# Patient Record
Sex: FEMALE | Employment: FULL TIME | URBAN - METROPOLITAN AREA
[De-identification: names, ages, dates, MRNs, and addresses within clinical notes are randomized per-mention and may not be internally consistent; named-entity substitution may affect disease eponyms.]

---

## 2023-06-26 ENCOUNTER — HOSPITAL ENCOUNTER (OUTPATIENT)
Dept: ULTRASOUND IMAGING | Age: 28
Discharge: HOME | End: 2023-06-26
Attending: OBSTETRICS & GYNECOLOGY
Payer: COMMERCIAL

## 2023-06-26 DIAGNOSIS — O36.80X0 PREGNANCY WITH INCONCLUSIVE FETAL VIABILITY, SINGLE OR UNSPECIFIED FETUS: ICD-10-CM

## 2023-06-26 PROCEDURE — 76801 OB US < 14 WKS SINGLE FETUS: CPT

## 2023-06-26 PROCEDURE — 76817 TRANSVAGINAL US OBSTETRIC: CPT

## 2024-02-03 ENCOUNTER — ANESTHESIA EVENT (OUTPATIENT)
Dept: LABOR AND DELIVERY | Age: 29
End: 2024-02-03
Payer: COMMERCIAL

## 2024-02-03 ENCOUNTER — ANESTHESIA (OUTPATIENT)
Dept: LABOR AND DELIVERY | Age: 29
End: 2024-02-03
Payer: COMMERCIAL

## 2024-02-03 ENCOUNTER — HOSPITAL ENCOUNTER (INPATIENT)
Age: 29
LOS: 2 days | Discharge: HOME | End: 2024-02-05
Attending: OBSTETRICS & GYNECOLOGY | Admitting: OBSTETRICS & GYNECOLOGY
Payer: COMMERCIAL

## 2024-02-03 PROBLEM — O42.92 FULL-TERM PREMATURE RUPTURE OF MEMBRANES: Status: ACTIVE | Noted: 2024-02-03

## 2024-02-03 LAB
ABO + RH BLD: NORMAL
AMPHETAMINES UR QL SCN>1000 NG/ML: NORMAL
BARBITURATES UR QL SCN>200 NG/ML: NORMAL
BASOPHILS # BLD: 0 K/UL (ref 0–0.2)
BASOPHILS NFR BLD: 0.2 %
BENZODIAZ UR QL SCN>200 NG/ML: NORMAL
BLD GP AB SCN SERPL QL: NORMAL
BLD GP AB SCN SERPL QL: NORMAL
BUPRENORPHINE+NOR UR QL SCN: NORMAL
CANNABINOIDS UR QL SCN>50 NG/ML: NORMAL
COCAINE UR QL SCN: NORMAL
DEPRECATED RDW RBC AUTO: 13.7 % (ref 12.4–15.4)
DRUG SCREEN COMMENT UR-IMP: NORMAL
EOSINOPHIL # BLD: 0 K/UL (ref 0–0.6)
EOSINOPHIL NFR BLD: 0.2 %
FENTANYL SCREEN, URINE: NORMAL
HCT VFR BLD AUTO: 36.4 % (ref 36–48)
HGB BLD-MCNC: 12.2 G/DL (ref 12–16)
LYMPHOCYTES # BLD: 1.6 K/UL (ref 1–5.1)
LYMPHOCYTES NFR BLD: 11.8 %
MCH RBC QN AUTO: 30.2 PG (ref 26–34)
MCHC RBC AUTO-ENTMCNC: 33.6 G/DL (ref 31–36)
MCV RBC AUTO: 89.9 FL (ref 80–100)
METHADONE UR QL SCN>300 NG/ML: NORMAL
MONOCYTES # BLD: 0.8 K/UL (ref 0–1.3)
MONOCYTES NFR BLD: 5.9 %
NEUTROPHILS # BLD: 11.4 K/UL (ref 1.7–7.7)
NEUTROPHILS NFR BLD: 81.9 %
OPIATES UR QL SCN>300 NG/ML: NORMAL
OXYCODONE UR QL SCN: NORMAL
PCP UR QL SCN>25 NG/ML: NORMAL
PH UR STRIP: 6 [PH]
PLATELET # BLD AUTO: 200 K/UL (ref 135–450)
PMV BLD AUTO: 9.3 FL (ref 5–10.5)
RBC # BLD AUTO: 4.05 M/UL (ref 4–5.2)
WBC # BLD AUTO: 14 K/UL (ref 4–11)

## 2024-02-03 PROCEDURE — 86850 RBC ANTIBODY SCREEN: CPT

## 2024-02-03 PROCEDURE — 6360000002 HC RX W HCPCS: Performed by: NURSE ANESTHETIST, CERTIFIED REGISTERED

## 2024-02-03 PROCEDURE — 6360000002 HC RX W HCPCS: Performed by: OBSTETRICS & GYNECOLOGY

## 2024-02-03 PROCEDURE — 2500000003 HC RX 250 WO HCPCS: Performed by: NURSE ANESTHETIST, CERTIFIED REGISTERED

## 2024-02-03 PROCEDURE — 2580000003 HC RX 258: Performed by: OBSTETRICS & GYNECOLOGY

## 2024-02-03 PROCEDURE — 1220000000 HC SEMI PRIVATE OB R&B

## 2024-02-03 PROCEDURE — 86900 BLOOD TYPING SEROLOGIC ABO: CPT

## 2024-02-03 PROCEDURE — 80307 DRUG TEST PRSMV CHEM ANLYZR: CPT

## 2024-02-03 PROCEDURE — 86901 BLOOD TYPING SEROLOGIC RH(D): CPT

## 2024-02-03 PROCEDURE — 85025 COMPLETE CBC W/AUTO DIFF WBC: CPT

## 2024-02-03 PROCEDURE — 86780 TREPONEMA PALLIDUM: CPT

## 2024-02-03 RX ORDER — ONDANSETRON 2 MG/ML
4 INJECTION INTRAMUSCULAR; INTRAVENOUS EVERY 6 HOURS PRN
Status: DISCONTINUED | OUTPATIENT
Start: 2024-02-03 | End: 2024-02-04

## 2024-02-03 RX ORDER — MISOPROSTOL 100 UG/1
800 TABLET ORAL PRN
Status: DISCONTINUED | OUTPATIENT
Start: 2024-02-03 | End: 2024-02-04

## 2024-02-03 RX ORDER — SODIUM CHLORIDE, SODIUM LACTATE, POTASSIUM CHLORIDE, CALCIUM CHLORIDE 600; 310; 30; 20 MG/100ML; MG/100ML; MG/100ML; MG/100ML
INJECTION, SOLUTION INTRAVENOUS CONTINUOUS
Status: DISCONTINUED | OUTPATIENT
Start: 2024-02-03 | End: 2024-02-04

## 2024-02-03 RX ORDER — FENTANYL CITRATE 50 UG/ML
INJECTION, SOLUTION INTRAMUSCULAR; INTRAVENOUS
Status: COMPLETED
Start: 2024-02-03 | End: 2024-02-03

## 2024-02-03 RX ORDER — CARBOPROST TROMETHAMINE 250 UG/ML
250 INJECTION, SOLUTION INTRAMUSCULAR PRN
Status: DISCONTINUED | OUTPATIENT
Start: 2024-02-03 | End: 2024-02-04

## 2024-02-03 RX ORDER — FERROUS SULFATE 325(65) MG
325 TABLET ORAL
Status: ON HOLD | COMMUNITY
End: 2024-02-05 | Stop reason: HOSPADM

## 2024-02-03 RX ORDER — BUPIVACAINE HYDROCHLORIDE 2.5 MG/ML
INJECTION, SOLUTION EPIDURAL; INFILTRATION; INTRACAUDAL PRN
Status: DISCONTINUED | OUTPATIENT
Start: 2024-02-03 | End: 2024-02-15 | Stop reason: SDUPTHER

## 2024-02-03 RX ORDER — FAMOTIDINE 10 MG/ML
20 INJECTION, SOLUTION INTRAVENOUS 2 TIMES DAILY
Status: DISCONTINUED | OUTPATIENT
Start: 2024-02-03 | End: 2024-02-04

## 2024-02-03 RX ORDER — LIDOCAINE HYDROCHLORIDE AND EPINEPHRINE BITARTRATE 20; .01 MG/ML; MG/ML
INJECTION, SOLUTION SUBCUTANEOUS PRN
Status: DISCONTINUED | OUTPATIENT
Start: 2024-02-03 | End: 2024-02-15 | Stop reason: SDUPTHER

## 2024-02-03 RX ORDER — METHYLERGONOVINE MALEATE 0.2 MG/ML
200 INJECTION INTRAVENOUS PRN
Status: DISCONTINUED | OUTPATIENT
Start: 2024-02-03 | End: 2024-02-04

## 2024-02-03 RX ORDER — ASPIRIN 81 MG/1
81 TABLET ORAL DAILY
Status: ON HOLD | COMMUNITY
End: 2024-02-04

## 2024-02-03 RX ORDER — DOCUSATE SODIUM 100 MG/1
100 CAPSULE, LIQUID FILLED ORAL 2 TIMES DAILY
Status: DISCONTINUED | OUTPATIENT
Start: 2024-02-03 | End: 2024-02-04

## 2024-02-03 RX ORDER — FENTANYL CITRATE 50 UG/ML
INJECTION, SOLUTION INTRAMUSCULAR; INTRAVENOUS PRN
Status: DISCONTINUED | OUTPATIENT
Start: 2024-02-03 | End: 2024-02-15 | Stop reason: SDUPTHER

## 2024-02-03 RX ORDER — SODIUM CHLORIDE, SODIUM LACTATE, POTASSIUM CHLORIDE, AND CALCIUM CHLORIDE .6; .31; .03; .02 G/100ML; G/100ML; G/100ML; G/100ML
1000 INJECTION, SOLUTION INTRAVENOUS PRN
Status: DISCONTINUED | OUTPATIENT
Start: 2024-02-03 | End: 2024-02-04

## 2024-02-03 RX ORDER — SODIUM CHLORIDE, SODIUM LACTATE, POTASSIUM CHLORIDE, AND CALCIUM CHLORIDE .6; .31; .03; .02 G/100ML; G/100ML; G/100ML; G/100ML
500 INJECTION, SOLUTION INTRAVENOUS PRN
Status: DISCONTINUED | OUTPATIENT
Start: 2024-02-03 | End: 2024-02-04

## 2024-02-03 RX ADMIN — FENTANYL CITRATE 50 MCG: 50 INJECTION, SOLUTION INTRAMUSCULAR; INTRAVENOUS at 16:24

## 2024-02-03 RX ADMIN — LIDOCAINE HYDROCHLORIDE AND EPINEPHRINE 3 ML: 20; 10 INJECTION, SOLUTION INFILTRATION; PERINEURAL at 16:15

## 2024-02-03 RX ADMIN — SODIUM CHLORIDE, POTASSIUM CHLORIDE, SODIUM LACTATE AND CALCIUM CHLORIDE: 600; 310; 30; 20 INJECTION, SOLUTION INTRAVENOUS at 16:30

## 2024-02-03 RX ADMIN — ONDANSETRON 4 MG: 2 INJECTION INTRAMUSCULAR; INTRAVENOUS at 23:44

## 2024-02-03 RX ADMIN — Medication 1 MILLI-UNITS/MIN: at 17:01

## 2024-02-03 RX ADMIN — FENTANYL CITRATE 50 MCG: 50 INJECTION, SOLUTION INTRAMUSCULAR; INTRAVENOUS at 16:15

## 2024-02-03 RX ADMIN — SODIUM CHLORIDE, POTASSIUM CHLORIDE, SODIUM LACTATE AND CALCIUM CHLORIDE: 600; 310; 30; 20 INJECTION, SOLUTION INTRAVENOUS at 11:00

## 2024-02-03 RX ADMIN — BUPIVACAINE HYDROCHLORIDE 5 MG: 2.5 INJECTION, SOLUTION EPIDURAL; INFILTRATION; INTRACAUDAL; PERINEURAL at 16:20

## 2024-02-03 RX ADMIN — Medication 15 ML/HR: at 16:26

## 2024-02-03 NOTE — ANESTHESIA PROCEDURE NOTES
CSE Block    Patient location during procedure: OB  Start time: 2/3/2024 4:10 PM  End time: 2/3/2024 4:15 PM  Reason for block: primary anesthetic  Staffing  Performed: resident/CRNA   Resident/CRNA: Dejon Perez APRN - CRNA  Performed by: Dejon Perez APRN - CRNA  Authorized by: James Ramirez MD    CSE  Patient position: sitting  Prep: ChloraPrep  Patient monitoring: cardiac monitor, continuous pulse ox and frequent blood pressure checks  Approach: midline  Provider prep: mask and sterile gloves  Spinal Needle  Needle type: pencil-tip   Needle gauge: 25 G  Needle length: 3.5 in  Epidural Needle  Injection technique: OTIS saline  Needle type: Tuohy   Needle gauge: 18 G  Needle length: 2.5 in  Needle insertion depth: 6 cm  Location: lumbar (1-5)  Catheter  Catheter type: end hole  Catheter size: 18 G  Catheter at skin depth: 6 cm  Test dose: negative  [id number]  Hemodynamics: stable  Preanesthetic Checklist  Completed: patient identified, IV checked, site marked, risks and benefits discussed, surgical/procedural consents, equipment checked, pre-op evaluation, timeout performed, anesthesia consent given, oxygen available, monitors applied/VS acknowledged, fire risk safety assessment completed and verbalized and blood product R/B/A discussed and consented

## 2024-02-03 NOTE — ANESTHESIA PRE PROCEDURE
Department of Anesthesiology  Preprocedure Note       Name:  Naheed FERREIRA   Age:  28 y.o.  :  1995                                          MRN:  [id number]         Date:  2/3/2024      Surgeon: * No surgeons listed *    Procedure: * No procedures listed *    Medications prior to admission:   Prior to Admission medications    Medication Sig Start Date End Date Taking? Authorizing Provider   Prenatal MV-Min-Fe Fum-FA-DHA (PRENATAL 1 PO) Take 1 tablet by mouth daily   Yes ProviderManuel MD   ferrous sulfate (IRON 325) 325 (65 Fe) MG tablet Take 1 tablet by mouth daily (with breakfast)   Yes ProviderManuel MD   aspirin 81 MG EC tablet Take 1 tablet by mouth daily   Yes Provider, MD Manuel       Current medications:    Current Facility-Administered Medications   Medication Dose Route Frequency Provider Last Rate Last Admin    lactated ringers IV soln infusion   IntraVENous Continuous Yesenia Sotomayor  mL/hr at 24 1100 New Bag at 24 1100    lactated ringers bolus bolus 500 mL  500 mL IntraVENous PRN Yesenia Sotomayor MD        Or    lactated ringers bolus bolus 1,000 mL  1,000 mL IntraVENous PRN Yesenia Sotomayor MD        methylergonovine (METHERGINE) injection 200 mcg  200 mcg IntraMUSCular PRN Yesenia Sotomayor MD        carboprost (HEMABATE) injection 250 mcg  250 mcg IntraMUSCular PRN Yesenia Sotomayor MD        miSOPROStol (CYTOTEC) tablet 800 mcg  800 mcg Rectal PRN Yesenia Sotomayor MD        tranexamic acid (CYKLOKAPRON) 1,000 mg in sodium chloride 0.9 % 100 mL IVPB  1,000 mg IntraVENous Once PRN Yesenia Sotomayor MD        oxytocin (PITOCIN) 30 units in 500 mL infusion  87.3 eric-units/min IntraVENous Continuous PRN Yesenia Sotomayor MD        And    oxytocin (PITOCIN) 10 unit bolus from the bag  10 Units IntraVENous PRN Yesenia Sotomayor MD        famotidine (PEPCID) injection 20 mg  20 mg IntraVENous BID Yesenia Sotomayor MD        ondansetron (ZOFRAN) injection 4 mg  4 mg IntraVENous Q6H

## 2024-02-03 NOTE — H&P
[facility]  Department of Obstetrics and Gynecology  History and Physical      CHIEF COMPLAINT:  ruptured membranes and contractions    HISTORY OF PRESENT ILLNESS:      The patient is a 28 y.o. y.o.  at 39w6d by LMP confirmed by 8w4d ultrasound, EDC 2024 is admitted for delivery.  Patient reports leakage of fluid starting yesterday 23, small spurts at first, becoming larger amounts and more continuous as time went by.  Clear fluid.  Contractions started during the night and became more painful over time.  + fetal movement, vaginal bleeding.    Prenatal Problem List includes: BMI >30, RH negative    [facility]:    [facility]    Blood Type/Rh:  A NEG    Antibody Screen:  Neg  Rubella:  Immune  RPR:  NR  Hepatitis B Surface Antigen: Neg  HIV:  Neg  Gonorrhea:  Neg  Chlamydia:  Neg  1 hour Glucose Tolerance Test:  101 early, then 92  Group B Strep:  negative      REVIEW OF SYSTEMS:    CONSTITUTIONAL:  negative for  fevers and sweats  NEURO: no headache, no vision changes  RESPIRATORY:  negative for dyspnea  CARDIOVASCULAR:  negative for  chest pain  GASTROINTESTINAL:  negative for nausea, vomiting and change in bowel habits, no RUQ pain    PHYSICAL EXAM:  Vitals:    24 1504   BP: 115/70   Pulse: (!) 105   Resp: 16   Temp: 98 °F (36.7 °C)   SpO2:      CONSTITUTIONAL:  awake, alert, cooperative, no apparent distress, and appears stated age  ABDOMEN:  Gravid, non tender  MUSCULOSKELETAL:  Full range of motion  Fetal heart rate: CAT 1, moderate variability, + accels.  REACTIVE NST  Cervix: 1/50 on admission  Contraction frequency:  q 5 minutes  Membranes:  ruptured    General Labs:      Recent Labs     24  1025   WBC 14.0*   HGB 12.2   HCT 36.4   MCV 89.9           ASSESSMENT AND PLAN:    28 y.o. y.o.   at 39w6d    Principal Problem:  -PROM  -Reassuring fetal status    Plan:   1. Admit to L&D  2. Admission labs drawn  3. Patient was counseled on plan of care

## 2024-02-04 LAB
ABO + RH BLD: NORMAL
FETAL SCREEN: NORMAL
REAGIN+T PALLIDUM IGG+IGM SERPL-IMP: NORMAL
RHIG LOT NUMBER: NORMAL

## 2024-02-04 PROCEDURE — 86901 BLOOD TYPING SEROLOGIC RH(D): CPT

## 2024-02-04 PROCEDURE — 86900 BLOOD TYPING SEROLOGIC ABO: CPT

## 2024-02-04 PROCEDURE — 6360000002 HC RX W HCPCS: Performed by: OBSTETRICS & GYNECOLOGY

## 2024-02-04 PROCEDURE — 6370000000 HC RX 637 (ALT 250 FOR IP): Performed by: OBSTETRICS & GYNECOLOGY

## 2024-02-04 PROCEDURE — 85461 HEMOGLOBIN FETAL: CPT

## 2024-02-04 PROCEDURE — 1220000000 HC SEMI PRIVATE OB R&B

## 2024-02-04 PROCEDURE — 0KQM0ZZ REPAIR PERINEUM MUSCLE, OPEN APPROACH: ICD-10-PCS | Performed by: OBSTETRICS & GYNECOLOGY

## 2024-02-04 PROCEDURE — 7200000001 HC VAGINAL DELIVERY

## 2024-02-04 RX ORDER — LANOLIN 100 %
OINTMENT (GRAM) TOPICAL PRN
Status: DISCONTINUED | OUTPATIENT
Start: 2024-02-04 | End: 2024-02-05 | Stop reason: HOSPADM

## 2024-02-04 RX ORDER — POLYETHYLENE GLYCOL 3350 17 G/17G
17 POWDER, FOR SOLUTION ORAL DAILY
Status: DISCONTINUED | OUTPATIENT
Start: 2024-02-04 | End: 2024-02-05 | Stop reason: HOSPADM

## 2024-02-04 RX ORDER — LIDOCAINE HYDROCHLORIDE 10 MG/ML
INJECTION, SOLUTION INFILTRATION; PERINEURAL
Status: DISCONTINUED
Start: 2024-02-04 | End: 2024-02-04

## 2024-02-04 RX ORDER — IBUPROFEN 800 MG/1
800 TABLET ORAL EVERY 8 HOURS
Status: DISCONTINUED | OUTPATIENT
Start: 2024-02-04 | End: 2024-02-05 | Stop reason: HOSPADM

## 2024-02-04 RX ORDER — SIMETHICONE 80 MG
80 TABLET,CHEWABLE ORAL EVERY 6 HOURS PRN
Status: DISCONTINUED | OUTPATIENT
Start: 2024-02-04 | End: 2024-02-05 | Stop reason: HOSPADM

## 2024-02-04 RX ORDER — SODIUM CHLORIDE, SODIUM LACTATE, POTASSIUM CHLORIDE, CALCIUM CHLORIDE 600; 310; 30; 20 MG/100ML; MG/100ML; MG/100ML; MG/100ML
INJECTION, SOLUTION INTRAVENOUS CONTINUOUS
Status: ACTIVE | OUTPATIENT
Start: 2024-02-04 | End: 2024-02-04

## 2024-02-04 RX ORDER — SODIUM CHLORIDE 0.9 % (FLUSH) 0.9 %
5-40 SYRINGE (ML) INJECTION PRN
Status: DISCONTINUED | OUTPATIENT
Start: 2024-02-04 | End: 2024-02-05 | Stop reason: HOSPADM

## 2024-02-04 RX ORDER — DOCUSATE SODIUM 100 MG/1
100 CAPSULE, LIQUID FILLED ORAL 2 TIMES DAILY
Status: DISCONTINUED | OUTPATIENT
Start: 2024-02-04 | End: 2024-02-05 | Stop reason: HOSPADM

## 2024-02-04 RX ORDER — FERROUS SULFATE 325(65) MG
325 TABLET ORAL 2 TIMES DAILY WITH MEALS
Status: DISCONTINUED | OUTPATIENT
Start: 2024-02-04 | End: 2024-02-05 | Stop reason: HOSPADM

## 2024-02-04 RX ORDER — FAMOTIDINE 20 MG/1
20 TABLET, FILM COATED ORAL 2 TIMES DAILY
Status: DISCONTINUED | OUTPATIENT
Start: 2024-02-04 | End: 2024-02-05 | Stop reason: HOSPADM

## 2024-02-04 RX ORDER — ACETAMINOPHEN 500 MG
1000 TABLET ORAL EVERY 8 HOURS
Status: DISCONTINUED | OUTPATIENT
Start: 2024-02-04 | End: 2024-02-05 | Stop reason: HOSPADM

## 2024-02-04 RX ADMIN — DOCUSATE SODIUM 100 MG: 100 CAPSULE, LIQUID FILLED ORAL at 09:33

## 2024-02-04 RX ADMIN — HUMAN RHO(D) IMMUNE GLOBULIN 300 MCG: 300 INJECTION, SOLUTION INTRAMUSCULAR at 09:36

## 2024-02-04 RX ADMIN — ACETAMINOPHEN 1000 MG: 500 TABLET ORAL at 09:33

## 2024-02-04 RX ADMIN — FAMOTIDINE 20 MG: 20 TABLET ORAL at 09:33

## 2024-02-04 RX ADMIN — DOCUSATE SODIUM 100 MG: 100 CAPSULE, LIQUID FILLED ORAL at 21:36

## 2024-02-04 RX ADMIN — IBUPROFEN 800 MG: 800 TABLET, FILM COATED ORAL at 14:34

## 2024-02-04 RX ADMIN — ACETAMINOPHEN 1000 MG: 500 TABLET ORAL at 02:16

## 2024-02-04 RX ADMIN — IBUPROFEN 800 MG: 800 TABLET, FILM COATED ORAL at 06:44

## 2024-02-04 ASSESSMENT — PAIN - FUNCTIONAL ASSESSMENT: PAIN_FUNCTIONAL_ASSESSMENT: ACTIVITIES ARE NOT PREVENTED

## 2024-02-04 ASSESSMENT — PAIN SCALES - GENERAL
PAINLEVEL_OUTOF10: 1
PAINLEVEL_OUTOF10: 0
PAINLEVEL_OUTOF10: 0
PAINLEVEL_OUTOF10: 1

## 2024-02-04 ASSESSMENT — PAIN DESCRIPTION - DESCRIPTORS: DESCRIPTORS: CRUSHING

## 2024-02-04 ASSESSMENT — PAIN DESCRIPTION - LOCATION: LOCATION: ABDOMEN

## 2024-02-04 NOTE — LACTATION NOTE
This note was copied from a baby's chart.  Lactation Progress Note      Data:   RN requests initial consult with primip breast feeder, and 14 hour old baby who delivered at 40 weeks gestation by . Parents report baby fed well during the \"muñoz hour\" after delivery, but has been sleepy at the breast with their attempts to offer this afternoon.       Action: Introduced self to parents as LC on for this evening and offered support with attempting to wake baby for latching. Parents agreeable. Encouraged parents to try undressing infant to provide STS contact with feeding and educated on the benefits of STS to mom, baby, and milk supply. Encouraged to try changing infant's diaper before feeding. FOB changed infant's diaper, diaper noted with void and meconium stool. Infant woke with diaper change and rooting. Tongue tie visible while rooting, with slight notch noted to tip of tongue indenting inward and with mild restriction observed with elevation of the tongue. Appears to extend tongue well. Digital suck evaluation done with gloved finger, infant able to extend tongue well beyond lower gumline, and curled tongue around finger with good peristalic/wave like movement of tongue with sucking. Discussed observations with parents of mild tongue tie and red flags to monitor for that may indicate need for revision including painful latch despite latching deeply onto the breast, or decreased output, poor weight trends. Encouraged to have pediatrician evaluate also on rounding so they are aware and can monitor also. Educated on the importance of obtaining a good deep comfortable latch with feedings. Infant given to mother to breastfeed. Assisted with positioning baby to the breast and shown football hold. Encouraged belly to belly and nose to nipple position so that baby is in good spinal alignment with one hand on each side of the breast. Shown how to support infant at the neck vs the crown of the head to support wide open

## 2024-02-04 NOTE — PROGRESS NOTES
Dr. Sotomayor notified of pt SVE lip/100/0. MD states she is on her way to hospital at this time.

## 2024-02-04 NOTE — L&D DELIVERY NOTE
Department of Obstetrics and Gynecology  Spontaneous Vaginal Delivery Note    Anesthesia:  Epidural- had to be removed due to separation of tubing right when pushing started.  Local anesthesia used for laceration repair    Delivery Summary:  29yo  @ 40w0d was admitted for PROM.  Labor was augmented with oxytocin.  I was called to room for delivery.  Head delivered OA over intact perineum.  Shoulders and body delivered easily and spontaneously crying infant was placed on mother's abdomen.  After approximately 1 minute, the cord was double clamped.  The baby's father cut the cord.  Cord blood was obtained.  Placenta delivered intact with gentle traction on cord.  Fundus was massaged and found to be firm.  Local anesthesia, 1% lidocaine, about 8ml, was injected into laceration.  A second degree laceration was repaired with 3-0 Vicryl in usual fashion.  Vagina was cleared of remaining blood and clots.  There were no foreign bodies.  Mother and baby tolerated delivery well.  EBL 50 ml.  APGAR 8/9    Condition:  infant stable to general nursery and mother stable    MD JHON Liu Boy Samantha [[id number]]      Labor Events     Labor: No   Steroids: None  Cervical Ripening Date/Time:      Antibiotics Received during Labor: No  Rupture Identifier: Sac 1  Rupture Date/Time:  24 16:00:00   Rupture Type: SROM  Fluid Color: Clear, Pink  Fluid Volume: Scant  Induction: None  Augmentation: Oxytocin  Labor Complications: None              Anesthesia    Method: Epidural       Labor Event Times      Labor onset date/time:  2/3/24 14:57:00     Dilation complete date/time:  2/3/24 23:15:00 EST     Start pushing date/time:  2/3/2024 23:20:00   Decision date/time (emergent ):            Delivery Details      Delivery Date: 24 Delivery Time: 00:02:00   Delivery Type: Vaginal, Spontaneous               Presentation    Presentation: Vertex  _: Occiput  _: Anterior       Shoulder

## 2024-02-04 NOTE — PROGRESS NOTES
Delivery of viable male infant at this time. This RN remained in continuous attendance at bedside until delivery .

## 2024-02-05 VITALS
WEIGHT: 190 LBS | HEIGHT: 63 IN | DIASTOLIC BLOOD PRESSURE: 70 MMHG | TEMPERATURE: 97.5 F | OXYGEN SATURATION: 100 % | RESPIRATION RATE: 16 BRPM | SYSTOLIC BLOOD PRESSURE: 112 MMHG | HEART RATE: 96 BPM | BODY MASS INDEX: 33.66 KG/M2

## 2024-02-05 LAB
DEPRECATED RDW RBC AUTO: 14 % (ref 12.4–15.4)
HCT VFR BLD AUTO: 31.3 % (ref 36–48)
HGB BLD-MCNC: 10.5 G/DL (ref 12–16)
MCH RBC QN AUTO: 30.7 PG (ref 26–34)
MCHC RBC AUTO-ENTMCNC: 33.5 G/DL (ref 31–36)
MCV RBC AUTO: 91.7 FL (ref 80–100)
PLATELET # BLD AUTO: 176 K/UL (ref 135–450)
PMV BLD AUTO: 8.9 FL (ref 5–10.5)
RBC # BLD AUTO: 3.42 M/UL (ref 4–5.2)
WBC # BLD AUTO: 11.8 K/UL (ref 4–11)

## 2024-02-05 PROCEDURE — 36415 COLL VENOUS BLD VENIPUNCTURE: CPT

## 2024-02-05 PROCEDURE — 85027 COMPLETE CBC AUTOMATED: CPT

## 2024-02-05 RX ORDER — IBUPROFEN 800 MG/1
800 TABLET ORAL EVERY 8 HOURS PRN
Qty: 60 TABLET | Refills: 0 | Status: SHIPPED | OUTPATIENT
Start: 2024-02-05

## 2024-02-05 NOTE — PLAN OF CARE
Problem: Vaginal Birth or  Section  Goal: Fetal and maternal status remain reassuring during the birth process  Description:  Birth OB-Pregnancy care plan goal which identifies if the fetal and maternal status remain reassuring during the birth process  2024 by Sukh Rich RN  Outcome: Completed  2024 by Sumi Segura RN  Outcome: Progressing     Problem: Postpartum  Goal: Experiences normal postpartum course  Description:  Postpartum OB-Pregnancy care plan goal which identifies if the mother is experiencing a normal postpartum course  2024 by Sukh Rich RN  Outcome: Progressing  2024 by Sumi Segura RN  Outcome: Progressing  Goal: Appropriate maternal -  bonding  Description:  Postpartum OB-Pregnancy care plan goal which identifies if the mother and  are bonding appropriately  Outcome: Progressing  Goal: Establishment of infant feeding pattern  Description:  Postpartum OB-Pregnancy care plan goal which identifies if the mother is establishing a feeding pattern with their   Outcome: Progressing  Goal: Incisions, wounds, or drain sites healing without S/S of infection  Outcome: Progressing     Problem: Pain  Goal: Verbalizes/displays adequate comfort level or baseline comfort level  2024 by Sukh Rich RN  Outcome: Progressing  2024 by Sumi Segura RN  Outcome: Progressing     Problem: Infection - Adult  Goal: Absence of infection at discharge  Outcome: Progressing  Goal: Absence of infection during hospitalization  Outcome: Progressing  Goal: Absence of fever/infection during anticipated neutropenic period  Outcome: Progressing     Problem: Safety - Adult  Goal: Free from fall injury  2024 by Sukh Rich RN  Outcome: Progressing  2024 by Sumi Segura RN  Outcome: Progressing     Problem: Discharge Planning  Goal: Discharge to home or other facility with appropriate 
  Problem: Vaginal Birth or  Section  Goal: Fetal and maternal status remain reassuring during the birth process  Description:  Birth OB-Pregnancy care plan goal which identifies if the fetal and maternal status remain reassuring during the birth process  Outcome: Completed     Problem: Postpartum  Goal: Experiences normal postpartum course  Description:  Postpartum OB-Pregnancy care plan goal which identifies if the mother is experiencing a normal postpartum course  2024 1541 by Sukh Rich RN  Outcome: Adequate for Discharge  2024 1411 by Sukh Rich RN  Outcome: Adequate for Discharge  2024 0746 by Sukh Rich RN  Outcome: Progressing  Goal: Appropriate maternal -  bonding  Description:  Postpartum OB-Pregnancy care plan goal which identifies if the mother and  are bonding appropriately  2024 154 by Sukh Rich RN  Outcome: Adequate for Discharge  2024 141 by Sukh Rich RN  Outcome: Adequate for Discharge  2024 07 by Sukh Rich RN  Outcome: Progressing  Goal: Establishment of infant feeding pattern  Description:  Postpartum OB-Pregnancy care plan goal which identifies if the mother is establishing a feeding pattern with their   2024 1541 by Sukh Rich RN  Outcome: Adequate for Discharge  2024 141 by Sukh Rich RN  Outcome: Adequate for Discharge  2024 07 by Sukh Rich RN  Outcome: Progressing  Goal: Incisions, wounds, or drain sites healing without S/S of infection  2024 154 by Sukh Rich RN  Outcome: Adequate for Discharge  2024 141 by Sukh Rich RN  Outcome: Adequate for Discharge  2024 0746 by Sukh Rich RN  Outcome: Progressing     Problem: Pain  Goal: Verbalizes/displays adequate comfort level or baseline comfort level  2024 154 by Sukh Rich RN  Outcome: Adequate for Discharge  2024 by Sukh Rich RN  Outcome: Adequate for 
  Problem: Vaginal Birth or  Section  Goal: Fetal and maternal status remain reassuring during the birth process  Description:  Birth OB-Pregnancy care plan goal which identifies if the fetal and maternal status remain reassuring during the birth process  Outcome: Progressing     Problem: Postpartum  Goal: Experiences normal postpartum course  Description:  Postpartum OB-Pregnancy care plan goal which identifies if the mother is experiencing a normal postpartum course  Outcome: Progressing     Problem: Pain  Goal: Verbalizes/displays adequate comfort level or baseline comfort level  Outcome: Progressing     Problem: Safety - Adult  Goal: Free from fall injury  Outcome: Progressing     Problem: Discharge Planning  Goal: Discharge to home or other facility with appropriate resources  Outcome: Progressing     
RN  Outcome: Adequate for Discharge  2/5/2024 0746 by Sukh Rich RN  Outcome: Progressing  Goal: Absence of fever/infection during anticipated neutropenic period  2/5/2024 1411 by Sukh Rich RN  Outcome: Adequate for Discharge  2/5/2024 0746 by Sukh Rich RN  Outcome: Progressing     Problem: Safety - Adult  Goal: Free from fall injury  2/5/2024 1411 by Sukh Rich RN  Outcome: Adequate for Discharge  2/5/2024 0746 by Sukh Rich RN  Outcome: Progressing     Problem: Discharge Planning  Goal: Discharge to home or other facility with appropriate resources  2/5/2024 1411 by Sukh Rich RN  Outcome: Adequate for Discharge  2/5/2024 0746 by Sukh Rich RN  Outcome: Progressing

## 2024-02-05 NOTE — DISCHARGE SUMMARY
Obstetric Discharge Summary    Admitting Diagnosis  IUP 40.0 weeks  PROM  Class 1 obesity  Rh negative    OB History          1    Para   1    Term   1            AB        Living   1         SAB        IAB        Ectopic        Molar        Multiple   0    Live Births   1                Reasons for Admission on 2/3/2024  9:20 AM  Full-term premature rupture of membranes [O42.92]  Induction of Labor    Prenatal Procedures  None    Intrapartum Procedures        Multiple birth?: No        Spontaneous Vaginal Delivery: See Labor and Delivery Summary       Postpartum Procedures  None    Postpartum/Operative Complications   None apparent    [place] Data  Information for the patient's :  Edwin FERREIRA [[id number]]   male   Birth Weight: 3.391 kg (7 lb 7.6 oz)   Discharge With Mother  Complications: No    Discharge Diagnosis     PPD#1  S/p     Discharge Information  Current Discharge Medication List        CONTINUE these medications which have NOT CHANGED    Details   Prenatal MV-Min-Fe Fum-FA-DHA (PRENATAL 1 PO) Take 1 tablet by mouth daily      ferrous sulfate (IRON 325) 325 (65 Fe) MG tablet Take 1 tablet by mouth daily (with breakfast)           STOP taking these medications       aspirin 81 MG EC tablet Comments:   Reason for Stopping:             OBGYN Attending Progress Note  PPD#1 s/p     S: No complaints, tolerating po intake, pain controlled by meds, + ambulation, +flatus, lochia decreasing less than menses, voiding without difficulty.     O: BP (!) 97/55   Pulse 81   Temp 97.9 °F (36.6 °C) (Oral)   Resp 16   Ht 1.6 m (5' 3\")   Wt 86.2 kg (190 lb)   LMP 2023 (Exact Date)   SpO2 100%   Breastfeeding Unknown   BMI 33.66 kg/m²   Abd - soft, fundus firm below umbilicus, incision c/d/i w dermabond, healing well  Ext warm well perfused    WBC/Hgb/Hct/Plts:  11.8/10.5/31.3/176 (817)    A/P: PPD #1 s/p     1. Recovering well  2. Meeting postpartum milestones.   3.

## 2024-02-05 NOTE — ANESTHESIA POSTPROCEDURE EVALUATION
Department of Anesthesiology  Postprocedure Note    Patient: Naheed FERREIRA  MRN: [id number]  YOB: 1995  Date of evaluation: 2/5/2024    Procedure Summary       Date: 02/03/24 Room / Location:     Anesthesia Start: 1610 Anesthesia Stop:     Procedure: Labor Analgesia Diagnosis:     Scheduled Providers:  Responsible Provider: James Ramirez MD    Anesthesia Type: general, spinal, epidural ASA Status: 2            Anesthesia Type: No value filed.    Bev Phase I: Bev Score: 9    Bev Phase II: Bev Score: 10    Anesthesia Post Evaluation    Patient location during evaluation: bedside  Patient participation: complete - patient participated  Level of consciousness: awake and alert  Airway patency: patent  Nausea & Vomiting: no nausea and no vomiting  Cardiovascular status: hemodynamically stable  Respiratory status: acceptable  Hydration status: stable  Pain management: adequate        No notable events documented.

## 2024-02-05 NOTE — LACTATION NOTE
This note was copied from a baby's chart.  Lactation Progress Note      Data:    F/U consult & discharge teaching for primip on day 1 pp w/ an infant born @ 40.0 weeks gestation. MOB reports breastfeeding has been going better but she is really sore, using a nipple shield as a barrier. Baby received frenotomy today. MOB inquiring about nipple confusion & pacifier use.          Action:    Introduced self & ensured name & lactation # is on whiteboard in room. Reviewed breast feeding education, healing / soothing sore nipples, pacifier use (recommended waiting 2-3 weeks unless MOB just needs a break, then use sparingly), avoiding nipple confusion, weaning from nipple shield, how the breasts work to make milk & protecting milk supply, & completed discharge teaching.     Reviewed infant feeding cues and encouraged mother to allow infant to breast feed on demand anytime feeding cues are shown and if no feeding cues are shown to attempt to wake infant to feed every 2-3 hours with a minimum of 8-12 feedings a day per 24 hour period. Breast feeding log reviewed, all questions answered, and outpatient resources provided. Mother instructed to call lactation for F/U care as needed.        Response:    MOB verbalized an understanding of education provided and will call for assistance as needed.

## 2024-02-05 NOTE — DISCHARGE INSTRUCTIONS
milk.  Avoid stimulation to your breasts. When showering, allow the water to strike only your back.  Wear a good fitting bra, such as a sports bra, until your milk dries up    OTHER REASONS TO CALL YOUR DOCTOR    Your abdomen is tender to the touch or you have pain that cannot be relieved.  Flu-like symptoms such as achy muscles and joints or you are experiencing extreme weakness or dizziness.  Persistent burning or increased urgency in urination.      LITERATURE PROVIDED    For Moms and Those Who Care About Them  Your 's Healthy Start, Grow Smart  Breastfeeding Best for Baby, Best for Mom.  [facility] Parent Information About Universal Hearing Screening  Controlling pain.    I have received the educational material listed above.  The  Channel has provided me with the opportunity to view instructional videos pertaining to infant care and the care of myself.    I verify that I have received the above information and have no further questions and feel confident to care for myself and my baby.    For more information about postpartum care, baby care and feeding, create a [facility] My Chart account, sign in and search using the magnifying glass, typing in postpartum, breast feeding or formula feeding.  [email or web address]

## 2024-02-05 NOTE — PROGRESS NOTES
Discharge Phone Call    Patient Name: Naheed FERREIRA     OB Care Provider: Yesenia Sotomayor MD Discharge Date: 2024    Disposition of baby:    Phone Number: [phone] (home)     Attempts to Contact:  Date:    Caller  Date:    Caller  Date:    Caller    Information for the patient's :  Edwin FERREIRA [[id number]]   Delivery Method: Vaginal, Spontaneous     1.  Now that you are at home is your pain being well controlled?   Y/N   If no, instruct to call       provider.      2. Are you breastfeeding?    Y/N    Do you need any extra support from our lactation staff?      Y/N    If yes, provide number for lactation.  3. Have you made or already had your first appointment with the baby's doctor? Y/N   If no, do      you know when to schedule it?  Y/N    4. Have you scheduled your follow-up appointment?  Y/N  If no, do you know when to schedule       it?    Y/N   If no, they can find it on printed discharge instructions.  5. Did staff discuss safe sleep during your stay? Y/N   6. Did we explain things in a way you could understand?  Y/N  7. Were we respectful of your preferences for labor and birth and include you in the plan of       care?  Y/N  If no, please explain _______________________________________________  8. Is there anyone in particular you would like to mention who provided care for you? _______      _________________________________________________________________________     9. Were you given a Post-Birth Warning Signs handout?  Y/N  Do you have it somewhere      easily accessible?  Y/N  If no, please send them a copy and ask them to put it somewhere      easily found.  10. Have you been crying excessively, having anger or mood swings that feel out of control, or       feel like you can't cope with caring for yourself or baby? Y/N   If yes, they may be showing       signs of postpartum depression and should call provider. There is also a        depression test on page C5 in their

## 2024-02-05 NOTE — PROGRESS NOTES
Pt A/O, VSS, Pt ambulates independently. All prescriptions discharge and follow up instructions given to the pt. The pt verbalizes understanding and denies questions. All belongings collected and sent with pt.  Pt was discharged off the unit in stable condition.

## 2024-02-05 NOTE — LACTATION NOTE
This note was copied from a baby's chart.  Lactation Progress Note      Data:   F/U with primip per RN request 1PP to evaluate sore nipples. MOB states that her nipples are very sore. Considering bottle feeding. Nipple shield offered by RN, and asked for consult. Would like LC to provide support.  Infant born at 40 weeks by .  Nipples noted to be red bilaterally. States that she has a crack on her left nipple with some bleeding noted overnight.    Infant with good output established, weight loss @ 4%  Tabby score by Severo OLGUIN RN, IBCLC 6    Action: Introduced self to patient as Lactation RN, name and phone number written on white board in room.     MOB sitting in rocking chair at time of consult attempt to latch with nipple shield. LC offered to assist with closer positioning, and demonstrate importance of placing nipple shield on correctly to everted nipple. MOB is agreeable. LC reviewed how to hand express from breast, and stimulate nipple. Colostrum easily expressed, and placed on tip of shield. LC then reviewed how to properly place shield onto nipple after retracting sides, and placing nipple in, then bringing out. MOB verbalizes understanding. Infant then turned more into mob, and brought down further to achieve asymmetric latch. MOB also needing reinforcement on bringing infant onto breast when opening mouth at widest point. Observed mob offering tip of nipple, and infant sucking nipple into mouth. Reinforced, that this will cause nipple damage and soreness if allowed to latch this way. LC then assisted mob to guide onto breast when opening wide. After 1 attempt, estephanie was achieved with estephanie noted and srs observed. MOB confirms latch is deeper, and she is not having pain with suck burst. \    Encouraged mob to use shield for few more feedings while using breast care items. Encouraged to call LC to again attempt infant back to breast without shield and infant latch and if she is having any pain. Reviewed

## 2024-02-05 NOTE — ANESTHESIA POSTPROCEDURE EVALUATION
Department of Anesthesiology  Postprocedure Note    Patient: Naheed FERREIRA  MRN: [id number]  YOB: 1995  Date of evaluation: 2/5/2024    Procedure Summary       Date: 02/03/24 Room / Location:     Anesthesia Start: 1610 Anesthesia Stop:     Procedure: Labor Analgesia Diagnosis:     Scheduled Providers:  Responsible Provider: James Ramirez MD    Anesthesia Type: general, spinal, epidural ASA Status: 2            Anesthesia Type: No value filed.    Bev Phase I: Bev Score: 9    Bev Phase II: Bev Score: 10    Anesthesia Post Evaluation    Patient location during evaluation: bedside  Patient participation: complete - patient participated  Level of consciousness: awake and alert  Airway patency: patent  Nausea & Vomiting: no nausea and no vomiting  Cardiovascular status: hemodynamically stable  Respiratory status: acceptable  Hydration status: stable  Pain management: adequate        No notable events documented.

## 2024-03-22 ENCOUNTER — LACTATION ENCOUNTER (OUTPATIENT)
Dept: OBGYN | Age: 29
End: 2024-03-22
